# Patient Record
Sex: FEMALE | Race: WHITE | Employment: OTHER | URBAN - METROPOLITAN AREA
[De-identification: names, ages, dates, MRNs, and addresses within clinical notes are randomized per-mention and may not be internally consistent; named-entity substitution may affect disease eponyms.]

---

## 2018-10-10 NOTE — PATIENT DISCUSSION
PATIENT HAS NOT HAD TO SEE NEURO FOR ABOUT 20 YEARS.   PATIENT UNDERSTANDS THAT [facility] MAY WANT MEDICAL CLEARANCE PRIOR TO SURGERY

## 2018-12-07 NOTE — PATIENT DISCUSSION
PT UNDERSTANDS OPTIONS AND DESIRES TO PROCEED WITH LASIK OU TO IMPROVE VA AND REDUCE DEPENDENCY ON GLS/CTL

## 2018-12-13 NOTE — PATIENT DISCUSSION
MYOPIA, OU- DISC OPT OF REFRACTIVE [id number]. PT UNDERSTANDS OPTIONS AND DESIRES TO PROCEED WITH REFRACTIVE SX TO IMPROVE VA AND REDUCE DEPENDENCY ON GLS/CTLS.

## 2018-12-13 NOTE — PATIENT DISCUSSION
Continue: Pred Forte (prednisolone acetate): drops,suspension: 1% 1 drop four times a day into both eyes

## 2018-12-13 NOTE — PATIENT DISCUSSION
HIGH MYOPIA, OU- DISCUSSED INCREASED RISK OF NEEDING A ENHANCEMENT AND HALO'S AROUND LIGHTS AT NIGHT.

## 2018-12-14 NOTE — PATIENT DISCUSSION
1 DAY  POST LASIK OU, WITH STRIA OD- WRINKLE SMOOTHED OUT WITH COTTON TIP TODAY. 1 GTT PROPARACANE INSERTED TODAY. BCL PLACED TODAY. FOLLOW TOMORROW WITH DR BLISS.

## 2018-12-21 NOTE — PATIENT DISCUSSION
Stopped Today: Pred Forte (prednisolone acetate): drops,suspension: 1% 1 drop four times a day into both eyes

## 2019-01-29 NOTE — PATIENT DISCUSSION
OK TO REDUCE ATS TO QID AND USE NON PRESERVED DROPS.   STRESSED IMPORTANCE OF WEARING SUNGLASSES WHEN OUTSIDE

## 2019-08-28 NOTE — PATIENT DISCUSSION
9 MONTH POST LASIK OU - RESIDUAL DRY EYE OS 2' INCOMPLETE LID CLOSURE ON ROUTINE BLINKS. PATIENT ABLE TO FULLY CLOSE EYES WHEN ASKED TO; NO LID DROOP PRESENT. ADVISED TO INCREASE ATS TO QID+/PRN, TAKE FREQUENT BREAKS FROM PLAYING VIDEO GAMES AND TO CONSCIOUSLY BLINK, MAY TRY SLEEPING MASK TO AVOID EXPOSURE FROM CEILING FAN.

## 2020-02-26 ENCOUNTER — NEW PATIENT COMPREHENSIVE (OUTPATIENT)
Dept: URBAN - METROPOLITAN AREA CLINIC 38 | Facility: CLINIC | Age: 50
End: 2020-02-26

## 2020-02-26 DIAGNOSIS — H52.03: ICD-10-CM

## 2020-02-26 DIAGNOSIS — H52.223: ICD-10-CM

## 2020-02-26 DIAGNOSIS — H52.4: ICD-10-CM

## 2020-02-26 PROCEDURE — 92015 DETERMINE REFRACTIVE STATE: CPT

## 2020-02-26 PROCEDURE — 92004 COMPRE OPH EXAM NEW PT 1/>: CPT

## 2020-02-26 ASSESSMENT — VISUAL ACUITY
OD_CC: 20/30-2
OS_CC: J1
OD_CC: J12
OS_CC: 20/20

## 2020-02-26 ASSESSMENT — TONOMETRY
OD_IOP_MMHG: 12
OS_IOP_MMHG: 12

## 2022-04-06 ENCOUNTER — EMERGENCY VISIT (OUTPATIENT)
Dept: URBAN - METROPOLITAN AREA CLINIC 38 | Facility: CLINIC | Age: 52
End: 2022-04-06

## 2022-04-06 DIAGNOSIS — H11.31: ICD-10-CM

## 2022-04-06 PROCEDURE — 92012 INTRM OPH EXAM EST PATIENT: CPT

## 2022-04-06 ASSESSMENT — VISUAL ACUITY
OS_SC: 20/20-1
OD_SC: 20/40-2
OU_SC: 20/20-1
OD_PH: 20/30-2

## 2022-04-06 ASSESSMENT — TONOMETRY
OS_IOP_MMHG: 13
OD_IOP_MMHG: 12

## 2022-06-22 ENCOUNTER — COMPREHENSIVE EXAM (OUTPATIENT)
Dept: URBAN - METROPOLITAN AREA CLINIC 38 | Facility: CLINIC | Age: 52
End: 2022-06-22

## 2022-06-22 DIAGNOSIS — H52.203: ICD-10-CM

## 2022-06-22 DIAGNOSIS — H52.03: ICD-10-CM

## 2022-06-22 DIAGNOSIS — H52.4: ICD-10-CM

## 2022-06-22 PROCEDURE — 92014 COMPRE OPH EXAM EST PT 1/>: CPT

## 2022-06-22 PROCEDURE — 92015 DETERMINE REFRACTIVE STATE: CPT

## 2022-06-22 ASSESSMENT — VISUAL ACUITY
OD_CC: 20/50
OD_CC: J12
OS_CC: J1
OS_CC: 20/20

## 2022-06-22 ASSESSMENT — TONOMETRY
OD_IOP_MMHG: 14
OS_IOP_MMHG: 14

## 2023-11-22 ENCOUNTER — COMPREHENSIVE EXAM (OUTPATIENT)
Dept: URBAN - METROPOLITAN AREA CLINIC 38 | Facility: CLINIC | Age: 53
End: 2023-11-22

## 2023-11-22 DIAGNOSIS — H52.03: ICD-10-CM

## 2023-11-22 DIAGNOSIS — H52.203: ICD-10-CM

## 2023-11-22 DIAGNOSIS — H52.4: ICD-10-CM

## 2023-11-22 PROCEDURE — 92015 DETERMINE REFRACTIVE STATE: CPT

## 2023-11-22 PROCEDURE — 92014 COMPRE OPH EXAM EST PT 1/>: CPT

## 2023-11-22 ASSESSMENT — VISUAL ACUITY
OD_PH: 20/50+2
OD_CC: 20/50
OS_CC: 20/20
OU_CC: 20/20
OU_CC: J1-
OD_CC: J1-

## 2023-11-22 ASSESSMENT — TONOMETRY
OD_IOP_MMHG: 14
OS_IOP_MMHG: 13